# Patient Record
Sex: FEMALE
[De-identification: names, ages, dates, MRNs, and addresses within clinical notes are randomized per-mention and may not be internally consistent; named-entity substitution may affect disease eponyms.]

---

## 2018-10-26 ENCOUNTER — HOSPITAL ENCOUNTER (EMERGENCY)
Dept: HOSPITAL 14 - H.ER | Age: 33
Discharge: HOME | End: 2018-10-26
Payer: SELF-PAY

## 2018-10-26 VITALS
DIASTOLIC BLOOD PRESSURE: 57 MMHG | HEART RATE: 78 BPM | SYSTOLIC BLOOD PRESSURE: 121 MMHG | RESPIRATION RATE: 16 BRPM | TEMPERATURE: 98.2 F

## 2018-10-26 VITALS — OXYGEN SATURATION: 100 %

## 2018-10-26 DIAGNOSIS — Z3A.16: ICD-10-CM

## 2018-10-26 DIAGNOSIS — O26.892: Primary | ICD-10-CM

## 2018-10-26 LAB
ALBUMIN SERPL-MCNC: 4.1 G/DL (ref 3.5–5)
ALBUMIN/GLOB SERPL: 1.1 {RATIO} (ref 1–2.1)
ALT SERPL-CCNC: 29 U/L (ref 9–52)
AST SERPL-CCNC: 25 U/L (ref 14–36)
BACTERIA #/AREA URNS HPF: (no result) /[HPF]
BASOPHILS # BLD AUTO: 0 K/UL (ref 0–0.2)
BASOPHILS NFR BLD: 0.3 % (ref 0–2)
BILIRUB UR-MCNC: NEGATIVE MG/DL
BUN SERPL-MCNC: 6 MG/DL (ref 7–17)
CALCIUM SERPL-MCNC: 9.6 MG/DL (ref 8.4–10.2)
COLOR UR: YELLOW
EOSINOPHIL # BLD AUTO: 0.1 K/UL (ref 0–0.7)
EOSINOPHIL NFR BLD: 0.8 % (ref 0–4)
ERYTHROCYTE [DISTWIDTH] IN BLOOD BY AUTOMATED COUNT: 14.3 % (ref 11.5–14.5)
GFR NON-AFRICAN AMERICAN: > 60
GLUCOSE UR STRIP-MCNC: (no result) MG/DL
HGB BLD-MCNC: 11.2 G/DL (ref 12–16)
LEUKOCYTE ESTERASE UR-ACNC: (no result) LEU/UL
LYMPHOCYTES # BLD AUTO: 1.4 K/UL (ref 1–4.3)
LYMPHOCYTES NFR BLD AUTO: 16.4 % (ref 20–40)
MCH RBC QN AUTO: 29.3 PG (ref 27–31)
MCHC RBC AUTO-ENTMCNC: 34.6 G/DL (ref 33–37)
MCV RBC AUTO: 84.7 FL (ref 81–99)
MONOCYTES # BLD: 0.6 K/UL (ref 0–0.8)
MONOCYTES NFR BLD: 7.2 % (ref 0–10)
NEUTROPHILS # BLD: 6.6 K/UL (ref 1.8–7)
NEUTROPHILS NFR BLD AUTO: 75.3 % (ref 50–75)
NRBC BLD AUTO-RTO: 0.1 % (ref 0–0)
PH UR STRIP: 5 [PH] (ref 5–8)
PLATELET # BLD: 253 K/UL (ref 130–400)
PMV BLD AUTO: 7.8 FL (ref 7.2–11.7)
PROT UR STRIP-MCNC: NEGATIVE MG/DL
RBC # BLD AUTO: 3.82 MIL/UL (ref 3.8–5.2)
RBC # UR STRIP: NEGATIVE /UL
SP GR UR STRIP: 1.01 (ref 1–1.03)
SQUAMOUS EPITHIAL: 4 /HPF (ref 0–5)
URINE CLARITY: (no result)
UROBILINOGEN UR-MCNC: (no result) MG/DL (ref 0.2–1)
WBC # BLD AUTO: 8.7 K/UL (ref 4.8–10.8)

## 2018-10-26 NOTE — ED PDOC
HPI: Abdomen


Time Seen by Provider: 10/26/18 17:56


Chief Complaint (Nursing): Abdominal Pain


Chief Complaint (Provider): pregnant with abdominal pain


History Per: Patient


Additional Complaint(s): 


32-year-old female currently 16 weeks pregnant presents with generalized 

abdominal pain that started 2 days ago. Patient has mild nausea with no vomit

ing. No vaginal bleeding or discharge. Patient does have slight dysuria. No 

fever or chills. This patient's first pregnancy. She does not have any children 

and denies history of ectopic or miscarriages.





PMD: [facility]





Past Medical History


Reviewed: Historical Data, Nursing Documentation, Vital Signs


Vital Signs: 





                                Last Vital Signs











Temp  98.4 F   10/26/18 17:38


 


Pulse  84   10/26/18 17:38


 


Resp  18   10/26/18 17:38


 


BP  121/75   10/26/18 17:38


 


Pulse Ox  100   10/26/18 17:38














- Medical History


PMH: No Chronic Diseases


Other PMH: 





- Surgical History


Surgical History: No Surg Hx





- Family History


Family History: States: No Known Family Hx





- Living Arrangements


Living Arrangements: With Family





- Social History


Current smoker - smoking cessation education provided: No


Alcohol: None


Drugs: Denies





- Allergies


Allergies/Adverse Reactions: 


                                    Allergies











Allergy/AdvReac Type Severity Reaction Status Date / Time


 


No Known Allergies Allergy   Verified 10/26/18 17:38














Review of Systems


ROS Statement: Except As Marked, All Systems Reviewed And Found Negative


Constitutional: Negative for: Fever


Gastrointestinal: Positive for: Nausea, Abdominal Pain.  Negative for: Vomiting,

Diarrhea, Constipation


Genitourinary Female: Positive for: Dysuria.  Negative for: Vaginal Discharge, 

Vaginal Bleeding





Physical Exam





- Reviewed


Nursing Documentation Reviewed: Yes


Vital Signs Reviewed: Yes





- Physical Exam


Appears: Positive for: Well, Non-toxic, No Acute Distress


Skin: Positive for: Normal Color.  Negative for: Rash


Eye Exam: Positive for: Normal appearance


Cardiovascular/Chest: Positive for: Regular Rate, Rhythm


Respiratory: Positive for: Normal Breath Sounds


Gastrointestinal/Abdominal: Positive for: Other (Gravid abdomen with slight 

suprapubic tenderness)


Back: Negative for: L CVA Tenderness, R CVA Tenderness


Extremity: Positive for: Normal ROM.  Negative for: Pedal Edema


Neurologic/Psych: Positive for: Alert, Oriented





- Laboratory Results


Result Diagrams: 


                                 10/26/18 18:59





                                 10/26/18 18:59





- ECG


O2 Sat by Pulse Oximetry: 100


Pulse Ox Interpretation: Normal





Medical Decision Making


Medical Decision Makin-year-old pregnant female with abdominal pain 





Plan:


CBC


CMP


Beta


Urine dip


OB US


PO tylenol





Disposition





- Clinical Impression


Clinical Impression: 


 Abdominal pain during pregnancy








- Patient ED Disposition


Is Patient to be Admitted: Transfer of Care





- Disposition


Disposition: Transfer of Care


Disposition Time: 20:07


Condition: STABLE


Forms:  [employer] (English)


Patient Signed Over To: Tori Noonan


Handoff Comments: Signed out pending ultrasound results and final disposition





Results





- Lab Results


Lab Results: 

















  10/26/18 10/26/18 10/26/18





  19:25 18:59 18:59


 


WBC   8.7 


 


RBC   3.82 


 


Hgb   11.2 L 


 


Hct   32.3 L 


 


MCV   84.7 


 


MCH   29.3 


 


MCHC   34.6 


 


RDW   14.3 


 


Plt Count   253 


 


MPV   7.8 


 


Neut % (Auto)   75.3 H 


 


Lymph % (Auto)   16.4 L 


 


Mono % (Auto)   7.2 


 


Eos % (Auto)   0.8 


 


Baso % (Auto)   0.3 


 


Neut # (Auto)   6.6 


 


Lymph # (Auto)   1.4 


 


Mono # (Auto)   0.6 


 


Eos # (Auto)   0.1 


 


Baso # (Auto)   0.0 


 


Sodium    134


 


Potassium    4.3


 


Chloride    102


 


Carbon Dioxide    23


 


Anion Gap    13


 


BUN    6 L


 


Creatinine    0.5 L


 


Est GFR ( Amer)    > 60


 


Est GFR (Non-Af Amer)    > 60


 


Random Glucose    88


 


Calcium    9.6


 


Total Bilirubin    0.3


 


AST    25


 


ALT    29


 


Alkaline Phosphatase    212 H D


 


Total Protein    7.9


 


Albumin    4.1


 


Globulin    3.8


 


Albumin/Globulin Ratio    1.1


 


Beta HCG, Quant    144428.00


 


Urine Color  Yellow  


 


Urine Clarity  Slighty-cloudy  


 


Urine pH  5.0  


 


Ur Specific Gravity  1.013  


 


Urine Protein  Negative  


 


Urine Glucose (UA)  Neg  


 


Urine Ketones  Trace  


 


Urine Blood  Negative  


 


Urine Nitrate  Negative  


 


Urine Bilirubin  Negative  


 


Urine Urobilinogen  0.2-1.0  


 


Ur Leukocyte Esterase  Neg  


 


Urine RBC (Auto)  3  


 


Urine Microscopic WBC  2  


 


Ur Squamous Epith Cells  4  


 


Urine Bacteria  Occ H

## 2018-10-26 NOTE — ED PDOC
- Laboratory Results


Result Diagrams: 


                                 10/26/18 18:59





                                 10/26/18 18:59





- ECG


O2 Sat by Pulse Oximetry: 100





Medical Decision Making


Medical Decision Making: 


Pt endorsed to me by KENTRELL Blair at 8pm pending pelvic U/S





Pelvic U/S: single, live, intrauterine gestation with a composite gestational 

age of 16 weeks, 6 days. CHANDRA 4/6/19. Placenta is posterior and free of the 

cervical os. 





Pt re-evaluated and states that she continues to have mild abdominal cramping, 

"like her period" but states that this is mostly when she is doing activity or 

ambulating. 





Recommended to rest and drink lots of water when she develops these symptoms. 

Stable for d/c home. Pt to f/u with her OB/GYN with further concern or return to

ER if noting bleeding, worsening cramping despite these measures. 








Disposition


Counseled Patient/Family Regarding: Studies Performed, Diagnosis, Need For 

Followup





- Clinical Impression


Clinical Impression: 


 Abdominal pain during pregnancy








- POA


Present On Arrival: None





- Disposition


Referrals: 


Remedios Martinez MD [Resident] - 


Disposition: Routine/Home


Disposition Time: 23:52


Condition: STABLE


Additional Instructions: 


When cramping, rest and drink water. If cramping persists, call your 

gynecologist or come back to ER. F/u with Dr. Martinez as planned. 


Forms:  [employer] (Sudanese)


Print Language: Jamaican

## 2018-10-27 NOTE — US
Date of service: 



10/26/2018



PROCEDURE:  OB Pelvic Ultrasound



HISTORY:

16 weeks w/generalized abdominal pain



LMP: 07/03/2018



COMPARISON:

None available.



FINDINGS:



UTERUS:

Placenta: Posterior, fundal 



Presentation: Breech 



BPD: 3.7 cm compatible with estimated gestational age of 17 weeks, 1 

day 



HC: 13.0 cm compatible with estimated gestational age of 16 weeks, 5 

days 



AC: 10.5 cm compatible with estimated gestational age of 16 weeks, 3 

days 



FL: 2.4 cm compatible with estimated gestational age of 17 weeks, 2 

days 



Fetal Heart rate: 153 bpm.



Fetal age (Ultrasound estimated): 16 weeks, 6 days



Kaylah-gestational hemorrhage: None



Date of delivery (Ultrasound estimated) : 04/06/2019



.1 grams +/-25.8 gram (6 ounces +/-1 ounce) 



CERVIX:

Measures 3.7 cm. Long and closed. No cervical abnormality seen.



FREE FLUID:

None.



OTHER FINDINGS:

None. 



IMPRESSION:

Single live intrauterine gestation with average ultrasound age of 16 

weeks, 6 days.  Fetal heart rate 153 beats per minute.  Cervix long 

and closed.